# Patient Record
Sex: MALE | URBAN - METROPOLITAN AREA
[De-identification: names, ages, dates, MRNs, and addresses within clinical notes are randomized per-mention and may not be internally consistent; named-entity substitution may affect disease eponyms.]

---

## 2017-01-10 ENCOUNTER — IMPORTED ENCOUNTER (OUTPATIENT)
Dept: URBAN - METROPOLITAN AREA CLINIC 31 | Facility: CLINIC | Age: 72
End: 2017-01-10

## 2017-01-10 PROBLEM — H52.10: Noted: 2017-01-10

## 2017-01-10 PROBLEM — Z96.1: Noted: 2017-01-10

## 2017-01-10 NOTE — PATIENT DISCUSSION
1.  We discussed risks vs. benefits of surgery. The patient has watched and understands the informed consent video. I have discussed the risks of laser refractive surgery with the patient. I informed the patient of the risk of infection (1:1000 for [address].)    I have discussed the possibility of postoperative halos starbursts and dryness. I reviewed the risk of corneal ectasia. We discussed the possibility of need for enhancement surgery. I have discussed the need for postoperative reading glasses. I have discussed the possibility for the patient to require glasses and/or contact lenses postoperatively for the clearest vision possible. I have answered all of the patients questions. Pt scheduled for [address] OD2. Pseudophakia OU - IOLs stable. Monitor.

## 2017-01-13 ENCOUNTER — IMPORTED ENCOUNTER (OUTPATIENT)
Dept: URBAN - METROPOLITAN AREA CLINIC 31 | Facility: CLINIC | Age: 72
End: 2017-01-13

## 2017-01-13 PROBLEM — H52.10: Noted: 2017-01-13

## 2017-01-13 PROCEDURE — 66999 UNLISTED PX ANT SEGMENT EYE: CPT

## 2017-01-13 NOTE — PATIENT DISCUSSION
Surger performed without difficulty. PO drops and instructions reviewed. Pt tolerated well and released to home in good condition.

## 2017-01-14 ENCOUNTER — IMPORTED ENCOUNTER (OUTPATIENT)
Dept: URBAN - METROPOLITAN AREA CLINIC 31 | Facility: CLINIC | Age: 72
End: 2017-01-14

## 2017-01-14 PROCEDURE — 99024 POSTOP FOLLOW-UP VISIT: CPT

## 2017-01-14 NOTE — PATIENT DISCUSSION
s/p [address] 86 doing well. PO drops as directed on instruction sheet. BCL to remain until surface healed tears prn. Call if any problems. Return for an appointment in 5 days for post op exam. with Dr. Eyad Crowley.

## 2017-01-18 ENCOUNTER — IMPORTED ENCOUNTER (OUTPATIENT)
Dept: URBAN - METROPOLITAN AREA CLINIC 31 | Facility: CLINIC | Age: 72
End: 2017-01-18

## 2017-01-18 PROCEDURE — 99024 POSTOP FOLLOW-UP VISIT: CPT

## 2017-01-18 NOTE — PATIENT DISCUSSION
s/p [address] 86 doing well. PO drops as directed on instruction sheet. BCL removed tears prn. Return for an appointment in 2 weeks for post op exam. with Dr. Paulette Don.

## 2017-02-01 ENCOUNTER — IMPORTED ENCOUNTER (OUTPATIENT)
Dept: URBAN - METROPOLITAN AREA CLINIC 31 | Facility: CLINIC | Age: 72
End: 2017-02-01

## 2017-02-01 PROCEDURE — 99024 POSTOP FOLLOW-UP VISIT: CPT

## 2017-02-01 NOTE — PATIENT DISCUSSION
s/p [address] 86 doing well. PO drops as directed on instruction sheet. Lotemax qd for 3 weeks tears prnReturn for an appointment in 3 weeks for post op exam. MRx. with Dr. Brent Campbell.

## 2017-03-01 ENCOUNTER — IMPORTED ENCOUNTER (OUTPATIENT)
Dept: URBAN - METROPOLITAN AREA CLINIC 31 | Facility: CLINIC | Age: 72
End: 2017-03-01

## 2017-03-01 PROCEDURE — 99024 POSTOP FOLLOW-UP VISIT: CPT

## 2017-03-01 NOTE — PATIENT DISCUSSION
1.  s/p PRK doing well. Lotemax until gone tears prn.2. Return for an appointment in 1 month for post op exam. with Dr. Barbar Castleman.

## 2017-04-04 ENCOUNTER — IMPORTED ENCOUNTER (OUTPATIENT)
Dept: URBAN - METROPOLITAN AREA CLINIC 31 | Facility: CLINIC | Age: 72
End: 2017-04-04

## 2017-04-04 PROBLEM — Z96.1: Noted: 2017-04-04

## 2017-04-04 PROCEDURE — 99024 POSTOP FOLLOW-UP VISIT: CPT

## 2017-04-04 NOTE — PATIENT DISCUSSION
1.  s/p PRK doing well.  stable off drops2. Pseudophakia OU - IOLs stable. Monitor. 3. Return for an appointment in 1 year for comprehensive exam. with Dr. Korey Helm.

## 2018-05-16 NOTE — PATIENT DISCUSSION
DRY EYE SYNDROME, OU: PRESCRIBED BLINK CONTACTS ARTIFICIAL TEARS BID - QID, OU. RETURN FOR FOLLOW-UP AS SCHEDULED OR SOONER IF SYMPTOMS WORSEN.

## 2018-05-16 NOTE — PATIENT DISCUSSION
VITREOUS FLOATERS, OU: STABLE. NO HOLES OR TEARS 360' OU. FLOATERS AND FLASHES PRECAUTIONS REVIEWED WITH PATIENT. PATIENT TO  RETURN FOR FOLLOW-UP AS SCHEDULED.

## 2019-08-09 NOTE — PATIENT DISCUSSION
&nbsp;Lens(es) Patient states Dr. Ray Rico wanted to switch him from Xarelto to Eliquis. He didn't get further information on this change and would like to discuss further to decided if he indeed needs to make this change. He also has a cleaning with his dentist on  8/13/19 and also surgery on forehead on 8/14/19. He is not sure whether he needs to stop his Xarelto or not. Please advise.      Phone: [phone] or [phone]

## 2020-10-10 ENCOUNTER — OFFICE VISIT (OUTPATIENT)
Dept: URBAN - METROPOLITAN AREA CLINIC 121 | Facility: CLINIC | Age: 75
End: 2020-10-10

## 2020-11-19 ENCOUNTER — OFFICE VISIT (OUTPATIENT)
Dept: URBAN - METROPOLITAN AREA CLINIC 63 | Facility: CLINIC | Age: 75
End: 2020-11-19

## 2020-11-24 ENCOUNTER — OFFICE VISIT (OUTPATIENT)
Dept: URBAN - METROPOLITAN AREA SURGERY CENTER 4 | Facility: SURGERY CENTER | Age: 75
End: 2020-11-24

## 2020-12-08 ENCOUNTER — OFFICE VISIT (OUTPATIENT)
Dept: URBAN - METROPOLITAN AREA SURGERY CENTER 4 | Facility: SURGERY CENTER | Age: 75
End: 2020-12-08

## 2020-12-15 ENCOUNTER — OFFICE VISIT (OUTPATIENT)
Dept: URBAN - METROPOLITAN AREA SURGERY CENTER 4 | Facility: SURGERY CENTER | Age: 75
End: 2020-12-15

## 2020-12-18 LAB — PATHOLOGY (INDENTED REPORT): (no result)

## 2020-12-23 ENCOUNTER — OFFICE VISIT (OUTPATIENT)
Dept: URBAN - METROPOLITAN AREA CLINIC 63 | Facility: CLINIC | Age: 75
End: 2020-12-23

## 2020-12-30 ENCOUNTER — OFFICE VISIT (OUTPATIENT)
Dept: URBAN - METROPOLITAN AREA CLINIC 63 | Facility: CLINIC | Age: 75
End: 2020-12-30

## 2021-01-06 ENCOUNTER — OFFICE VISIT (OUTPATIENT)
Dept: URBAN - METROPOLITAN AREA CLINIC 63 | Facility: CLINIC | Age: 76
End: 2021-01-06

## 2021-02-10 ENCOUNTER — OFFICE VISIT (OUTPATIENT)
Dept: URBAN - METROPOLITAN AREA CLINIC 63 | Facility: CLINIC | Age: 76
End: 2021-02-10

## 2021-02-22 ENCOUNTER — IMPORTED ENCOUNTER (OUTPATIENT)
Dept: URBAN - METROPOLITAN AREA CLINIC 31 | Facility: CLINIC | Age: 76
End: 2021-02-22

## 2021-02-22 PROBLEM — Z96.1: Noted: 2021-02-22

## 2021-02-22 PROBLEM — H17.89: Noted: 2021-02-22

## 2021-02-22 PROBLEM — H10.403: Noted: 2021-02-22

## 2021-02-22 PROBLEM — Z98.890: Noted: 2021-02-22

## 2021-02-22 PROCEDURE — 92004 COMPRE OPH EXAM NEW PT 1/>: CPT

## 2021-02-22 NOTE — PATIENT DISCUSSION
1.  Pseudophakia OU - IOLs stable. Monitor for changes in vision. 2. SP PRK OD - monitor. 3. Allergic Conjunctivitis OU -- The condition was  discussed with the patient. Bepreve BID OU PRN4. Return for an appointment in 1 year for comprehensive exam. with Dr. David Vazquez.

## 2021-04-01 ENCOUNTER — IMPORTED ENCOUNTER (OUTPATIENT)
Dept: URBAN - METROPOLITAN AREA CLINIC 31 | Facility: CLINIC | Age: 76
End: 2021-04-01

## 2021-04-01 PROCEDURE — 92015 DETERMINE REFRACTIVE STATE: CPT

## 2021-04-28 ENCOUNTER — OFFICE VISIT (OUTPATIENT)
Dept: URBAN - METROPOLITAN AREA CLINIC 63 | Facility: CLINIC | Age: 76
End: 2021-04-28

## 2021-08-06 NOTE — PATIENT DISCUSSION
VITREOUS FLOATERS OU:  STABLE WITHOUT HOLES/TEARS/BREAKS ON THOROUGH DFE. EDU PT ON FINDINGS AND SI/SX OF RD INCLUDING ^FLOATERS/FLASHES/VEIL OVER VISION AND ADVISED TO RTC IMMEDIATELY IF ANY OCCUR. MONITOR.

## 2021-08-06 NOTE — PATIENT DISCUSSION
DRY EYE SYNDROME OU: EDUCATED PATIENT ON FINDINGS. PATIENT ASYMPTOMATIC. RECOMMEND CL-SAFE ARTIFICIAL TEARS PRN OU IF SI/SX ARISE. DISCUSSED GOOD VISUAL HYGIENE INCLUDING 20/20/20 RULE AND CONSCIOUS BLINKING. MONITOR.

## 2021-08-06 NOTE — PATIENT DISCUSSION
MYOPIA OU: UPDATED SRX/CL RX RELEASED TO PATIENT. DISCUSSED PROPER WEAR/CARE/HYGIENE OF CLS. RTC IF DISCOMFORT.

## 2021-11-08 ENCOUNTER — OFFICE VISIT (OUTPATIENT)
Dept: URBAN - METROPOLITAN AREA CLINIC 121 | Facility: CLINIC | Age: 76
End: 2021-11-08

## 2021-11-24 ENCOUNTER — OFFICE VISIT (OUTPATIENT)
Dept: URBAN - METROPOLITAN AREA CLINIC 63 | Facility: CLINIC | Age: 76
End: 2021-11-24

## 2022-04-02 ASSESSMENT — VISUAL ACUITY
OS_CC: 20/20
OU_CC: 20/20
OS_CC: 20/20-2
OD_CC: 20/40-2
OD_CC: 20/30+2
OU_SC: 20/50
OD_CC: 20/20
OS_CC: 20/25-1
OU_CC: 20/20
OS_CC: 20/25-1
OD_CC: 20/25-2
OS_SC: 20/20
OS_CC: 20/20
OU_SC: 20/20-1
OD_SC: 20/20
OD_CC: 20/20
OU_SC: 20/20
OD_SC: 20/20
OS_SC: 20/25+2
OD_CC: 20/40

## 2022-04-02 ASSESSMENT — TONOMETRY
OD_IOP_MMHG: 14
OS_IOP_MMHG: 15
OD_IOP_MMHG: 12
OS_IOP_MMHG: 11
OD_IOP_MMHG: 10

## 2022-07-09 ENCOUNTER — TELEPHONE ENCOUNTER (OUTPATIENT)
Dept: URBAN - METROPOLITAN AREA CLINIC 121 | Facility: CLINIC | Age: 77
End: 2022-07-09

## 2022-07-09 RX ORDER — CAMPHOR 0.45 %
GEL (GRAM) TOPICAL
Refills: 0 | OUTPATIENT
Start: 2021-04-28 | End: 2021-11-24

## 2022-07-09 RX ORDER — VALSARTAN 160 MG/1
TABLET ORAL ONCE A DAY
Refills: 0 | OUTPATIENT
Start: 2021-04-28 | End: 2021-11-24

## 2022-07-09 RX ORDER — FINASTERIDE 5 MG/1
TABLET, FILM COATED ORAL ONCE A DAY
Refills: 0 | OUTPATIENT
Start: 2021-04-28 | End: 2021-11-24

## 2022-07-09 RX ORDER — ASPIRIN 325 MG/1
TABLET ORAL ONCE A DAY
Refills: 0 | OUTPATIENT
Start: 2021-01-06 | End: 2021-04-28

## 2022-07-09 RX ORDER — VALSARTAN 320 MG/1
TABLET ORAL ONCE A DAY
Refills: 0 | OUTPATIENT
Start: 2018-03-14 | End: 2019-01-31

## 2022-07-09 RX ORDER — AMLODIPINE BESYLATE 5 MG/1
TABLET ORAL
Refills: 0 | OUTPATIENT
Start: 2020-09-20 | End: 2021-01-06

## 2022-07-09 RX ORDER — OMEPRAZOLE 20 MG/1
ONCE A DAY CAPSULE, DELAYED RELEASE ORAL ONCE A DAY
Refills: 1 | OUTPATIENT
Start: 2020-12-23 | End: 2020-12-31

## 2022-07-09 RX ORDER — FAMOTIDINE 20 MG/1
TABLET ORAL
Refills: 0 | OUTPATIENT
Start: 2020-09-20 | End: 2021-04-28

## 2022-07-09 RX ORDER — BISOPROLOL FUMARATE AND HYDROCHLOROTHIAZIDE 2.5; 6.25 MG/1; MG/1
TABLET ORAL TWICE A DAY
Refills: 0 | OUTPATIENT
Start: 2019-01-31 | End: 2020-11-19

## 2022-07-09 RX ORDER — SIMVASTATIN 20 MG/1
TABLET, FILM COATED ORAL ONCE A DAY
Refills: 0 | OUTPATIENT
Start: 2017-12-25 | End: 2018-03-14

## 2022-07-09 RX ORDER — FAMOTIDINE 10 MG
TABLET ORAL ONCE A DAY
Refills: 0 | OUTPATIENT
Start: 2017-12-25 | End: 2018-03-14

## 2022-07-09 RX ORDER — KRILL/OM-3/DHA/EPA/PHOSPHO/AST 1000-230MG
CAPSULE ORAL
Refills: 0 | OUTPATIENT
Start: 2020-11-19 | End: 2021-01-06

## 2022-07-09 RX ORDER — IPRATROPIUM BROMIDE 21 UG/1
SPRAY, METERED NASAL AS NEEDED
Refills: 0 | OUTPATIENT
Start: 2021-04-28 | End: 2021-11-24

## 2022-07-09 RX ORDER — VALSARTAN 320 MG/1
TABLET ORAL ONCE A DAY
Refills: 0 | OUTPATIENT
Start: 2018-01-17 | End: 2018-03-14

## 2022-07-09 RX ORDER — FLUOCINONIDE 0.5 MG/G
GEL TOPICAL
Refills: 0 | OUTPATIENT
Start: 2020-11-19 | End: 2021-04-28

## 2022-07-09 RX ORDER — VALSARTAN 320 MG/1
TABLET ORAL ONCE A DAY
Refills: 0 | OUTPATIENT
Start: 2020-11-19 | End: 2021-01-06

## 2022-07-09 RX ORDER — COLCHICINE 0.6 MG/1
TABLET ORAL
Refills: 0 | OUTPATIENT
Start: 2021-04-09 | End: 2021-04-28

## 2022-07-09 RX ORDER — SIMVASTATIN 20 MG/1
TABLET, FILM COATED ORAL
Refills: 0 | OUTPATIENT
Start: 2021-02-14 | End: 2021-04-28

## 2022-07-09 RX ORDER — FLUOXETINE HCL 20 MG
CAPSULE ORAL ONCE A DAY
Refills: 0 | OUTPATIENT
Start: 2018-03-14 | End: 2019-01-31

## 2022-07-09 RX ORDER — FLUOXETINE HCL 20 MG
CAPSULE ORAL ONCE A DAY
Refills: 0 | OUTPATIENT
Start: 2019-01-31 | End: 2020-11-19

## 2022-07-09 RX ORDER — MULTIVIT-MIN/FOLIC/VIT K/LYCOP 400-300MCG
TABLET ORAL ONCE A DAY
Refills: 0 | OUTPATIENT
Start: 2021-04-28 | End: 2021-11-24

## 2022-07-09 RX ORDER — IBUPROFEN 200 MG
TABLET ORAL ONCE A DAY
Refills: 0 | OUTPATIENT
Start: 2018-03-14 | End: 2019-01-31

## 2022-07-09 RX ORDER — SIMVASTATIN 20 MG/1
TABLET, FILM COATED ORAL
Refills: 0 | OUTPATIENT
Start: 2020-09-20 | End: 2020-11-19

## 2022-07-09 RX ORDER — ASPIRIN 81 MG/1
TABLET, COATED ORAL ONCE A DAY
Refills: 0 | OUTPATIENT
Start: 2021-04-28 | End: 2021-04-28

## 2022-07-09 RX ORDER — SIMVASTATIN 20 MG/1
TABLET, FILM COATED ORAL
Refills: 0 | OUTPATIENT
Start: 2020-11-19 | End: 2021-01-06

## 2022-07-09 RX ORDER — PANTOPRAZOLE SODIUM 40 MG/1
TABLET, DELAYED RELEASE ORAL
Refills: 0 | OUTPATIENT
Start: 2021-03-29 | End: 2021-04-28

## 2022-07-09 RX ORDER — VALSARTAN 320 MG/1
TABLET ORAL ONCE A DAY
Refills: 0 | OUTPATIENT
Start: 2019-01-31 | End: 2020-11-19

## 2022-07-09 RX ORDER — FINASTERIDE 5 MG/1
TABLET, FILM COATED ORAL ONCE A DAY
Refills: 0 | OUTPATIENT
Start: 2021-01-06 | End: 2021-04-28

## 2022-07-09 RX ORDER — BISOPROLOL FUMARATE AND HYDROCHLOROTHIAZIDE 2.5; 6.25 MG/1; MG/1
TABLET ORAL
Refills: 0 | OUTPATIENT
Start: 2020-11-09 | End: 2021-01-06

## 2022-07-09 RX ORDER — AMLODIPINE BESYLATE 5 MG/1
TABLET ORAL ONCE A DAY
Refills: 0 | OUTPATIENT
Start: 2018-03-14 | End: 2019-01-31

## 2022-07-09 RX ORDER — BISOPROLOL FUMARATE AND HYDROCHLOROTHIAZIDE 2.5; 6.25 MG/1; MG/1
TABLET ORAL TWICE A DAY
Refills: 0 | OUTPATIENT
Start: 2018-03-14 | End: 2019-01-31

## 2022-07-09 RX ORDER — AMLODIPINE BESYLATE 5 MG/1
TABLET ORAL
Refills: 0 | OUTPATIENT
Start: 2021-02-14 | End: 2021-04-28

## 2022-07-09 RX ORDER — FINASTERIDE 5 MG/1
TABLET, FILM COATED ORAL ONCE A DAY
Refills: 0 | OUTPATIENT
Start: 2019-01-31 | End: 2020-11-19

## 2022-07-09 RX ORDER — FINASTERIDE 5 MG/1
TABLET, FILM COATED ORAL ONCE A DAY
Refills: 0 | OUTPATIENT
Start: 2020-11-19 | End: 2021-01-06

## 2022-07-09 RX ORDER — PREDNISONE 10 MG/1
TABLET ORAL
Refills: 0 | OUTPATIENT
Start: 2021-04-09 | End: 2021-04-28

## 2022-07-09 RX ORDER — FLUOXETINE HYDROCHLORIDE 20 MG/1
TABLET ORAL
Refills: 0 | OUTPATIENT
Start: 2020-09-20 | End: 2021-01-06

## 2022-07-09 RX ORDER — BISOPROLOL FUMARATE AND HYDROCHLOROTHIAZIDE 2.5; 6.25 MG/1; MG/1
TABLET ORAL TWICE A DAY
Refills: 0 | OUTPATIENT
Start: 2018-01-17 | End: 2018-03-14

## 2022-07-09 RX ORDER — KRILL/OM-3/DHA/EPA/PHOSPHO/AST 1000-230MG
CAPSULE ORAL
Refills: 0 | OUTPATIENT
Start: 2019-01-31 | End: 2020-11-19

## 2022-07-09 RX ORDER — OMEPRAZOLE 20 MG/1
ONCE A DAY ONE CAPSULE 30MIN BEFORE BREAKFAST CAPSULE, DELAYED RELEASE ORAL ONCE A DAY
Refills: 1 | OUTPATIENT
Start: 2020-12-15 | End: 2020-12-23

## 2022-07-09 RX ORDER — DIPHENHYDRAMINE HCL 2 %
CREAM (GRAM) TOPICAL TAKE AS DIRECTED
Refills: 0 | OUTPATIENT
Start: 2018-01-17 | End: 2018-03-14

## 2022-07-09 RX ORDER — CHLORHEXIDINE GLUCONATE 4 %
LIQUID (ML) TOPICAL
Refills: 0 | OUTPATIENT
Start: 2020-11-19 | End: 2021-04-28

## 2022-07-09 RX ORDER — LIDOCAINE HCL 4 %
CREAM (GRAM) TOPICAL ONCE A DAY
Refills: 0 | OUTPATIENT
Start: 2018-01-17 | End: 2019-01-31

## 2022-07-09 RX ORDER — CLOBETASOL PROPIONATE 0.5 MG/G
CREAM TOPICAL
Refills: 0 | OUTPATIENT
Start: 2020-09-20 | End: 2021-01-06

## 2022-07-09 RX ORDER — AMLODIPINE BESYLATE 5 MG/1
TABLET ORAL ONCE A DAY
Refills: 0 | OUTPATIENT
Start: 2017-12-25 | End: 2018-03-14

## 2022-07-09 RX ORDER — IPRATROPIUM BROMIDE 21 UG/1
SPRAY, METERED NASAL
Refills: 0 | OUTPATIENT
Start: 2020-11-19 | End: 2021-04-28

## 2022-07-09 RX ORDER — FINASTERIDE 5 MG/1
TABLET, FILM COATED ORAL ONCE A DAY
Refills: 0 | OUTPATIENT
Start: 2018-01-17 | End: 2018-03-14

## 2022-07-09 RX ORDER — DIPHENHYDRAMINE HCL 2 %
CREAM (GRAM) TOPICAL TAKE AS DIRECTED
Refills: 0 | OUTPATIENT
Start: 2018-03-14 | End: 2019-01-31

## 2022-07-09 RX ORDER — SIMVASTATIN 20 MG/1
TABLET, FILM COATED ORAL ONCE A DAY
Refills: 0 | OUTPATIENT
Start: 2018-03-14 | End: 2019-01-31

## 2022-07-09 RX ORDER — FLUOXETINE HYDROCHLORIDE 20 MG/1
CAPSULE ORAL ONCE A DAY
Refills: 0 | OUTPATIENT
Start: 2017-12-25 | End: 2018-01-17

## 2022-07-09 RX ORDER — CLOBETASOL PROPIONATE 0.5 MG/G
CREAM TOPICAL
Refills: 0 | OUTPATIENT
Start: 2021-02-15 | End: 2021-04-28

## 2022-07-09 RX ORDER — VALSARTAN 320 MG/1
TABLET ORAL ONCE A DAY
Refills: 0 | OUTPATIENT
Start: 2021-01-06 | End: 2021-04-28

## 2022-07-09 RX ORDER — IBUPROFEN 200 MG
TABLET ORAL ONCE A DAY
Refills: 0 | OUTPATIENT
Start: 2018-01-17 | End: 2018-03-14

## 2022-07-09 RX ORDER — FLUOXETINE HCL 20 MG
CAPSULE ORAL ONCE A DAY
Refills: 0 | OUTPATIENT
Start: 2018-01-17 | End: 2018-03-14

## 2022-07-09 RX ORDER — FINASTERIDE 5 MG/1
TABLET, FILM COATED ORAL ONCE A DAY
Refills: 0 | OUTPATIENT
Start: 2018-03-14 | End: 2019-01-31

## 2022-07-10 ENCOUNTER — TELEPHONE ENCOUNTER (OUTPATIENT)
Dept: URBAN - METROPOLITAN AREA CLINIC 121 | Facility: CLINIC | Age: 77
End: 2022-07-10

## 2022-07-10 RX ORDER — CLOBETASOL PROPIONATE 0.5 MG/G
CREAM TOPICAL
Refills: 0 | Status: ACTIVE | COMMUNITY
Start: 2021-01-06

## 2022-07-10 RX ORDER — SIMVASTATIN 20 MG/1
TABLET, FILM COATED ORAL ONCE A DAY
Refills: 0 | Status: ACTIVE | COMMUNITY
Start: 2019-01-31

## 2022-07-10 RX ORDER — FAMOTIDINE 10 MG
TABLET ORAL ONCE A DAY
Refills: 0 | Status: ACTIVE | COMMUNITY
Start: 2018-03-14

## 2022-07-10 RX ORDER — FLUCONAZOLE 100 MG/1
TAKE TWO TABLETS ON DAY 1, THEN TAKE ONE TABLET DAILY X 3 WEEKS TABLET ORAL
Refills: 0 | Status: ACTIVE | COMMUNITY
Start: 2018-02-15

## 2022-07-10 RX ORDER — FLUOXETINE HYDROCHLORIDE 20 MG/1
CAPSULE ORAL ONCE A DAY
Refills: 0 | Status: ACTIVE | COMMUNITY
Start: 2021-11-24

## 2022-07-10 RX ORDER — SIMVASTATIN 20 MG/1
TABLET, FILM COATED ORAL ONCE A DAY
Refills: 0 | Status: ACTIVE | COMMUNITY
Start: 2021-11-24

## 2022-07-10 RX ORDER — NAPROXEN SODIUM 220 MG
ONCE A DAY TABLET ORAL ONCE A DAY
Refills: 1 | Status: ACTIVE | COMMUNITY
Start: 2021-01-12

## 2022-07-10 RX ORDER — MULTIVIT-MIN/FOLIC/VIT K/LYCOP 400-300MCG
TABLET ORAL ONCE A DAY
Refills: 0 | Status: ACTIVE | COMMUNITY
Start: 2021-11-24

## 2022-07-10 RX ORDER — BISOPROLOL FUMARATE AND HYDROCHLOROTHIAZIDE 2.5; 6.25 MG/1; MG/1
TABLET ORAL TWICE A DAY
Refills: 0 | Status: ACTIVE | COMMUNITY
Start: 2021-01-06

## 2022-07-10 RX ORDER — CHOLESTYRAMINE 4 G/9G
TAKE ONCE DAILY PO WITH MEALS. TAKE OTHER DRUGS AT LEAST 1 HR BEFORE OR 4-6 HRS AFTER TAKING CHOLESTYRAMINE TO MINIMIZE ANY INTERFERENCE POWDER, FOR SUSPENSION ORAL ONCE A DAY
Refills: 1 | Status: ACTIVE | COMMUNITY
Start: 2021-01-08

## 2022-07-10 RX ORDER — AMLODIPINE BESYLATE 5 MG/1
TABLET ORAL ONCE A DAY
Refills: 0 | Status: ACTIVE | COMMUNITY
Start: 2019-01-31

## 2022-07-10 RX ORDER — CLOBETASOL PROPIONATE 0.5 MG/G
CREAM TOPICAL AS NEEDED
Refills: 0 | Status: ACTIVE | COMMUNITY
Start: 2021-11-24

## 2022-07-10 RX ORDER — AMLODIPINE BESYLATE 5 MG/1
TABLET ORAL ONCE A DAY
Refills: 0 | Status: ACTIVE | COMMUNITY
Start: 2021-04-28

## 2022-07-10 RX ORDER — CLOBETASOL PROPIONATE 0.5 MG/G
CREAM TOPICAL AS NEEDED
Refills: 0 | Status: ACTIVE | COMMUNITY
Start: 2021-04-28

## 2022-07-10 RX ORDER — FINASTERIDE 5 MG/1
TABLET, FILM COATED ORAL ONCE A DAY
Refills: 0 | Status: ACTIVE | COMMUNITY
Start: 2021-11-24

## 2022-07-10 RX ORDER — FAMOTIDINE 20 MG/1
TABLET ORAL TWICE A DAY
Refills: 0 | Status: ACTIVE | COMMUNITY
Start: 2021-04-28

## 2022-07-10 RX ORDER — FAMOTIDINE 20 MG/1
TABLET ORAL ONCE A DAY
Refills: 0 | Status: ACTIVE | COMMUNITY
Start: 2021-11-24

## 2022-07-10 RX ORDER — AMLODIPINE BESYLATE 5 MG/1
TABLET ORAL ONCE A DAY
Refills: 0 | Status: ACTIVE | COMMUNITY
Start: 2021-01-06

## 2022-07-10 RX ORDER — AMLODIPINE BESYLATE 5 MG/1
TABLET ORAL ONCE A DAY
Refills: 0 | Status: ACTIVE | COMMUNITY
Start: 2021-11-24

## 2022-07-10 RX ORDER — VALSARTAN 160 MG/1
TABLET ORAL ONCE A DAY
Refills: 0 | Status: ACTIVE | COMMUNITY
Start: 2021-11-24

## 2022-07-10 RX ORDER — CAMPHOR 0.45 %
GEL (GRAM) TOPICAL
Refills: 0 | Status: ACTIVE | COMMUNITY
Start: 2021-11-24

## 2022-07-10 RX ORDER — OMEPRAZOLE 20 MG/1
ONCE A DAY CAPSULE, DELAYED RELEASE ORAL ONCE A DAY
Refills: 1 | Status: ACTIVE | COMMUNITY
Start: 2020-12-31

## 2022-07-10 RX ORDER — FLUOXETINE HYDROCHLORIDE 20 MG/1
TABLET ORAL ONCE A DAY
Refills: 0 | Status: ACTIVE | COMMUNITY
Start: 2021-01-06

## 2022-07-10 RX ORDER — SIMVASTATIN 20 MG/1
TABLET, FILM COATED ORAL ONCE A DAY
Refills: 0 | Status: ACTIVE | COMMUNITY
Start: 2021-04-28

## 2022-07-10 RX ORDER — SIMVASTATIN 20 MG/1
TABLET, FILM COATED ORAL ONCE A DAY
Refills: 0 | Status: ACTIVE | COMMUNITY
Start: 2021-01-06

## 2022-08-29 NOTE — PATIENT DISCUSSION
Updated SRx and CLRx released to patient. Gave patient trials of Acuvue Oasys 1-Day with Hydraluxe for patient to try - can call back for Rx if he prefers these over AV 1-Day Moist. Discussed proper wear/care/hygiene of CLs. RTC if discomfort.

## 2022-12-15 ENCOUNTER — OFFICE VISIT (OUTPATIENT)
Dept: URBAN - METROPOLITAN AREA CLINIC 63 | Facility: CLINIC | Age: 77
End: 2022-12-15
Payer: MEDICARE

## 2022-12-15 ENCOUNTER — LAB OUTSIDE AN ENCOUNTER (OUTPATIENT)
Dept: URBAN - METROPOLITAN AREA CLINIC 63 | Facility: CLINIC | Age: 77
End: 2022-12-15

## 2022-12-15 VITALS
OXYGEN SATURATION: 91 % | WEIGHT: 218 LBS | HEART RATE: 81 BPM | HEIGHT: 69 IN | BODY MASS INDEX: 32.29 KG/M2 | TEMPERATURE: 96.4 F | SYSTOLIC BLOOD PRESSURE: 148 MMHG | DIASTOLIC BLOOD PRESSURE: 78 MMHG

## 2022-12-15 DIAGNOSIS — D64.89 ANEMIA DUE TO OTHER CAUSE, NOT CLASSIFIED: ICD-10-CM

## 2022-12-15 DIAGNOSIS — K76.0 FATTY LIVER: ICD-10-CM

## 2022-12-15 DIAGNOSIS — Z86.010 PERSONAL HISTORY OF COLONIC POLYPS: ICD-10-CM

## 2022-12-15 DIAGNOSIS — R93.2 ABNORMAL FINDING ON IMAGING OF LIVER: ICD-10-CM

## 2022-12-15 DIAGNOSIS — Z87.19 HISTORY OF DIVERTICULITIS: ICD-10-CM

## 2022-12-15 PROBLEM — 428283002 HISTORY OF POLYP OF COLON (SITUATION): Status: ACTIVE | Noted: 2022-12-14

## 2022-12-15 PROBLEM — 197321007: Status: ACTIVE | Noted: 2022-12-14

## 2022-12-15 PROCEDURE — 99213 OFFICE O/P EST LOW 20 MIN: CPT | Performed by: INTERNAL MEDICINE

## 2022-12-15 RX ORDER — FAMOTIDINE 20 MG/1
TABLET ORAL ONCE A DAY
Refills: 0 | Status: ACTIVE | COMMUNITY
Start: 2021-11-24

## 2022-12-15 RX ORDER — BISOPROLOL FUMARATE AND HYDROCHLOROTHIAZIDE 2.5; 6.25 MG/1; MG/1
TABLET ORAL TWICE A DAY
Refills: 0 | Status: DISCONTINUED | COMMUNITY
Start: 2021-01-06

## 2022-12-15 RX ORDER — FINASTERIDE 5 MG/1
TABLET, FILM COATED ORAL ONCE A DAY
Refills: 0 | Status: ACTIVE | COMMUNITY
Start: 2021-11-24

## 2022-12-15 RX ORDER — CHOLESTYRAMINE 4 G/9G
TAKE ONCE DAILY PO WITH MEALS. TAKE OTHER DRUGS AT LEAST 1 HR BEFORE OR 4-6 HRS AFTER TAKING CHOLESTYRAMINE TO MINIMIZE ANY INTERFERENCE POWDER, FOR SUSPENSION ORAL ONCE A DAY
Refills: 1 | Status: DISCONTINUED | COMMUNITY
Start: 2021-01-08

## 2022-12-15 RX ORDER — FLUOXETINE HYDROCHLORIDE 20 MG/1
TABLET ORAL ONCE A DAY
Refills: 0 | Status: ACTIVE | COMMUNITY
Start: 2021-01-06

## 2022-12-15 RX ORDER — VALSARTAN 160 MG/1
TABLET ORAL ONCE A DAY
Refills: 0 | Status: ACTIVE | COMMUNITY
Start: 2021-11-24

## 2022-12-15 RX ORDER — SIMVASTATIN 20 MG/1
TABLET, FILM COATED ORAL ONCE A DAY
Refills: 0 | Status: ACTIVE | COMMUNITY
Start: 2021-04-28

## 2022-12-15 RX ORDER — FAMOTIDINE 10 MG
TABLET ORAL ONCE A DAY
Refills: 0 | Status: DISCONTINUED | COMMUNITY
Start: 2018-03-14

## 2022-12-15 RX ORDER — MULTIVIT-MIN/FOLIC/VIT K/LYCOP 400-300MCG
TABLET ORAL ONCE A DAY
Refills: 0 | Status: ACTIVE | COMMUNITY
Start: 2021-11-24

## 2022-12-15 RX ORDER — CLOBETASOL PROPIONATE 0.5 MG/G
CREAM TOPICAL AS NEEDED
Refills: 0 | Status: ACTIVE | COMMUNITY
Start: 2021-04-28

## 2022-12-15 RX ORDER — CAMPHOR 0.45 %
GEL (GRAM) TOPICAL
Refills: 0 | Status: ACTIVE | COMMUNITY
Start: 2021-11-24

## 2022-12-15 RX ORDER — FLUOXETINE HYDROCHLORIDE 20 MG/1
CAPSULE ORAL ONCE A DAY
Refills: 0 | Status: DISCONTINUED | COMMUNITY
Start: 2021-11-24

## 2022-12-15 RX ORDER — FLUCONAZOLE 100 MG/1
TAKE TWO TABLETS ON DAY 1, THEN TAKE ONE TABLET DAILY X 3 WEEKS TABLET ORAL
Refills: 0 | Status: DISCONTINUED | COMMUNITY
Start: 2018-02-15

## 2022-12-15 RX ORDER — OMEPRAZOLE 20 MG/1
ONCE A DAY CAPSULE, DELAYED RELEASE ORAL ONCE A DAY
Refills: 1 | Status: DISCONTINUED | COMMUNITY
Start: 2020-12-31

## 2022-12-15 RX ORDER — AMLODIPINE BESYLATE 5 MG/1
TABLET ORAL ONCE A DAY
Refills: 0 | Status: ACTIVE | COMMUNITY
Start: 2021-04-28

## 2022-12-15 NOTE — HPI-TODAY'S VISIT:
Jacqueline is a pleasant 77-year-old male who presents today for follow-up. History of recurrent diverticulitis and C. difficile. Status post laparoscopic hand-assisted low anterior resection with complete mobilization of the splenic flexure on 3/17/2021 with Dr. Bhatia.  Pathology consistent with diverticular disease and no evidence of malignancy.  Noted heartburn well controlled with Pepcid 20 mg.   Labs dated 2/8/2022 showed a hemoglobin of 12.3, hematocrit 38.8.  Platelets normal.  PT/INR normal.  Normal renal function.  Normal LFTs.  FibroScan dated 12/10/2021 demonstrated S3 and F0  Evaluated by Dr. Serrano and had a FibroScan done on 1/5/2022 with CAP score 271, 4.0 kPa.  Concerning for NAFLD.  Recommended follow-up FibroScan 12 months later.  Advised of lifestyle modifications.  Does not currently qualify for clinical trial therapy but will be notified if they are eligible for any future trials  Labs dated 4/22/2021 showed a normal hemoglobin.  Sed rate previously elevated at 29.  Normal renal function.  Normal LFTs.  Iron saturation 19% (L) but normal iron levels, ferritin and vitamin B12.  TSH normal.  CRP 1.1 (H)  CT abdomen/pelvis without contrast dated 3/23/2021 showed high-grade small bowel obstruction with site of obstruction probably in the left lower abdomen/upper pelvis.  New small amount of ascites.  Liver findings showed slightly nodular hepatic contour without other definitive stigmata of cirrhosis.  EGD dated 12/15/2020 revealed a regular Z-line.  Erosive gastropathy.  Gastritis.  Normal third portion of the duodenum.  No specific pathologic abnormality.  Reactive gastropathy negative for H. pylori.  Colonoscopy dated 12/15/2020 demonstrated a 5 mm tubular adenoma removed from the cecum.  Diminutive tubular adenoma removed the mid transverse colon.  Diminutive tubular adenoma removed from the mid transverse colon.  Diminutive tubular adenoma removed from the mid descending colon.  Diverticulosis in the entire examined colon as well as nonbleeding internal hemorrhoids present upon retroflexion.  Patient states doing well having regular BM w/o rectal bleeding, constipation, diarrhea, abd pain, heartburn, dysphagia or wt loss.

## 2023-01-11 ENCOUNTER — TELEPHONE ENCOUNTER (OUTPATIENT)
Dept: URBAN - METROPOLITAN AREA CLINIC 63 | Facility: CLINIC | Age: 78
End: 2023-01-11

## 2023-01-12 ENCOUNTER — TELEPHONE ENCOUNTER (OUTPATIENT)
Dept: URBAN - METROPOLITAN AREA CLINIC 64 | Facility: CLINIC | Age: 78
End: 2023-01-12

## 2023-01-30 ENCOUNTER — TELEPHONE ENCOUNTER (OUTPATIENT)
Dept: URBAN - METROPOLITAN AREA CLINIC 63 | Facility: CLINIC | Age: 78
End: 2023-01-30

## 2023-11-15 ENCOUNTER — LAB OUTSIDE AN ENCOUNTER (OUTPATIENT)
Dept: URBAN - METROPOLITAN AREA CLINIC 63 | Facility: CLINIC | Age: 78
End: 2023-11-15

## 2023-11-15 ENCOUNTER — OFFICE VISIT (OUTPATIENT)
Dept: URBAN - METROPOLITAN AREA CLINIC 63 | Facility: CLINIC | Age: 78
End: 2023-11-15
Payer: MEDICARE

## 2023-11-15 ENCOUNTER — DASHBOARD ENCOUNTERS (OUTPATIENT)
Age: 78
End: 2023-11-15

## 2023-11-15 VITALS
TEMPERATURE: 98.3 F | WEIGHT: 214.4 LBS | HEART RATE: 88 BPM | SYSTOLIC BLOOD PRESSURE: 123 MMHG | HEIGHT: 69 IN | BODY MASS INDEX: 31.76 KG/M2 | RESPIRATION RATE: 20 BRPM | DIASTOLIC BLOOD PRESSURE: 62 MMHG | OXYGEN SATURATION: 93 %

## 2023-11-15 DIAGNOSIS — K76.0 FATTY LIVER: ICD-10-CM

## 2023-11-15 DIAGNOSIS — Z86.010 PERSONAL HISTORY OF COLONIC POLYPS: ICD-10-CM

## 2023-11-15 DIAGNOSIS — R93.2 ABNORMAL FINDING ON IMAGING OF LIVER: ICD-10-CM

## 2023-11-15 DIAGNOSIS — Z87.19 HISTORY OF DIVERTICULITIS: ICD-10-CM

## 2023-11-15 PROCEDURE — 99214 OFFICE O/P EST MOD 30 MIN: CPT | Performed by: INTERNAL MEDICINE

## 2023-11-15 RX ORDER — FINASTERIDE 5 MG/1
TABLET, FILM COATED ORAL ONCE A DAY
Refills: 0 | Status: ACTIVE | COMMUNITY
Start: 2021-11-24

## 2023-11-15 RX ORDER — MULTIVIT-MIN/FOLIC/VIT K/LYCOP 400-300MCG
TABLET ORAL ONCE A DAY
Refills: 0 | Status: ACTIVE | COMMUNITY
Start: 2021-11-24

## 2023-11-15 RX ORDER — FAMOTIDINE 20 MG/1
TABLET ORAL ONCE A DAY
Refills: 0 | Status: ACTIVE | COMMUNITY
Start: 2021-11-24

## 2023-11-15 RX ORDER — AMLODIPINE BESYLATE 5 MG/1
TABLET ORAL ONCE A DAY
Refills: 0 | Status: ACTIVE | COMMUNITY
Start: 2021-04-28

## 2023-11-15 RX ORDER — CAMPHOR 0.45 %
GEL (GRAM) TOPICAL
Refills: 0 | Status: ACTIVE | COMMUNITY
Start: 2021-11-24

## 2023-11-15 RX ORDER — ALLOPURINOL 200 MG/1
1 TABLET TABLET ORAL ONCE A DAY
Status: ACTIVE | COMMUNITY

## 2023-11-15 RX ORDER — SIMVASTATIN 20 MG/1
TABLET, FILM COATED ORAL ONCE A DAY
Refills: 0 | Status: ACTIVE | COMMUNITY
Start: 2021-04-28

## 2023-11-15 RX ORDER — VALSARTAN 160 MG/1
TABLET ORAL ONCE A DAY
Refills: 0 | Status: ACTIVE | COMMUNITY
Start: 2021-11-24

## 2023-11-15 RX ORDER — FLUOXETINE HYDROCHLORIDE 20 MG/1
TABLET ORAL ONCE A DAY
Refills: 0 | Status: ACTIVE | COMMUNITY
Start: 2021-01-06

## 2023-11-15 NOTE — HPI-TODAY'S VISIT:
Jacqueline is a pleasant 78-year-old male who presents today for a follow-up. History of recurrent diverticulitis and C. difficile. Status post laparoscopic hand-assisted low anterior resection with complete mobilization of the splenic flexure on 3/17/2021 with Dr. Bhatia.  Pathology consistent with diverticular disease and no evidence of malignancy. Noted heartburn well controlled with Pepcid 20 mg.  Last visit admitted to having regular bowel movements.  MRI 12/2022 revealing severe steatosis Labs dated 3/8/2023 showed hemoglobin 12.7, hematocrit 39.9.  Platelets normal.  Normal renal function.  Normal LFTs.  Iron studies normal.  Ferritin normal.  Vitamin B12 normal. FibroScan dated 12/10/2021 demonstrated S3 and F0  Evaluated by Dr. Serrano and had a FibroScan done on 1/5/2022 with CAP score 271, 4.0 kPa.  Concerning for NAFLD.  Recommended follow-up FibroScan 12 months later.  Advised of lifestyle modifications.  Does not currently qualify for clinical trial therapy but will be notified if they are eligible for any future trials  CT abdomen/pelvis without contrast dated 3/23/2021 showed high-grade small bowel obstruction with site of obstruction probably in the left lower abdomen/upper pelvis.  New small amount of ascites.  Liver findings showed slightly nodular hepatic contour without other definitive stigmata of cirrhosis.  EGD dated 12/15/2020 revealed a regular Z-line.  Erosive gastropathy.  Gastritis.  Normal third portion of the duodenum.  No specific pathologic abnormality.  Reactive gastropathy negative for H. pylori.  Colonoscopy dated 12/15/2020 demonstrated a 5 mm tubular adenoma removed from the cecum.  Diminutive tubular adenoma removed the mid transverse colon.  Diminutive tubular adenoma removed from the mid transverse colon.  Diminutive tubular adenoma removed from the mid descending colon.  Diverticulosis in the entire examined colon as well as nonbleeding internal hemorrhoids present upon retroflexion.   Labs 11/9 reveal nl LFTS renal fxn, hgb 12.7. Denies rectal bleeding , constipaition.wt loss. Takes pepcid 20mg with benefit. Patient refusing colonoscopy at this time

## 2023-11-16 ENCOUNTER — LAB OUTSIDE AN ENCOUNTER (OUTPATIENT)
Dept: URBAN - METROPOLITAN AREA CLINIC 63 | Facility: CLINIC | Age: 78
End: 2023-11-16

## 2023-11-16 ENCOUNTER — TELEPHONE ENCOUNTER (OUTPATIENT)
Dept: URBAN - METROPOLITAN AREA CLINIC 63 | Facility: CLINIC | Age: 78
End: 2023-11-16

## 2024-02-15 ENCOUNTER — COMPREHENSIVE EXAM (OUTPATIENT)
Dept: URBAN - METROPOLITAN AREA CLINIC 29 | Facility: CLINIC | Age: 79
End: 2024-02-15

## 2024-02-15 DIAGNOSIS — H52.4: ICD-10-CM

## 2024-02-15 DIAGNOSIS — H52.13: ICD-10-CM

## 2024-02-15 DIAGNOSIS — H02.203: ICD-10-CM

## 2024-02-15 DIAGNOSIS — D48.5: ICD-10-CM

## 2024-02-15 DIAGNOSIS — H52.223: ICD-10-CM

## 2024-02-15 DIAGNOSIS — H10.13: ICD-10-CM

## 2024-02-15 DIAGNOSIS — H43.811: ICD-10-CM

## 2024-02-15 DIAGNOSIS — H02.206: ICD-10-CM

## 2024-02-15 PROCEDURE — 99214 OFFICE O/P EST MOD 30 MIN: CPT

## 2024-02-15 PROCEDURE — 92015 DETERMINE REFRACTIVE STATE: CPT

## 2024-02-15 ASSESSMENT — VISUAL ACUITY
OS_CC: 20/25
OD_CC: 20/20-1

## 2024-02-15 ASSESSMENT — TONOMETRY
OS_IOP_MMHG: 20
OD_IOP_MMHG: 18